# Patient Record
Sex: FEMALE | Race: WHITE | NOT HISPANIC OR LATINO | URBAN - METROPOLITAN AREA
[De-identification: names, ages, dates, MRNs, and addresses within clinical notes are randomized per-mention and may not be internally consistent; named-entity substitution may affect disease eponyms.]

---

## 2017-05-30 ENCOUNTER — OUTPATIENT (OUTPATIENT)
Dept: OUTPATIENT SERVICES | Facility: HOSPITAL | Age: 67
LOS: 1 days | End: 2017-05-30

## 2017-09-19 ENCOUNTER — RESULT REVIEW (OUTPATIENT)
Age: 67
End: 2017-09-19

## 2017-09-27 ENCOUNTER — APPOINTMENT (OUTPATIENT)
Dept: OTOLARYNGOLOGY | Facility: CLINIC | Age: 67
End: 2017-09-27
Payer: MEDICARE

## 2017-09-27 VITALS — SYSTOLIC BLOOD PRESSURE: 110 MMHG | HEIGHT: 63.5 IN | HEART RATE: 69 BPM | DIASTOLIC BLOOD PRESSURE: 71 MMHG

## 2017-09-27 PROCEDURE — 92557 COMPREHENSIVE HEARING TEST: CPT

## 2017-09-27 PROCEDURE — 92567 TYMPANOMETRY: CPT

## 2017-09-27 PROCEDURE — 99214 OFFICE O/P EST MOD 30 MIN: CPT

## 2018-01-09 ENCOUNTER — OUTPATIENT (OUTPATIENT)
Dept: OUTPATIENT SERVICES | Facility: HOSPITAL | Age: 68
LOS: 1 days | End: 2018-01-09

## 2018-06-07 ENCOUNTER — OUTPATIENT (OUTPATIENT)
Dept: OUTPATIENT SERVICES | Facility: HOSPITAL | Age: 68
LOS: 1 days | End: 2018-06-07

## 2018-09-06 ENCOUNTER — MESSAGE (OUTPATIENT)
Age: 68
End: 2018-09-06

## 2018-09-17 ENCOUNTER — APPOINTMENT (OUTPATIENT)
Dept: OTOLARYNGOLOGY | Facility: CLINIC | Age: 68
End: 2018-09-17
Payer: MEDICARE

## 2018-09-17 VITALS
WEIGHT: 251 LBS | HEART RATE: 65 BPM | HEIGHT: 63.5 IN | BODY MASS INDEX: 43.92 KG/M2 | SYSTOLIC BLOOD PRESSURE: 95 MMHG | DIASTOLIC BLOOD PRESSURE: 53 MMHG

## 2018-09-17 DIAGNOSIS — H69.81 OTHER SPECIFIED DISORDERS OF EUSTACHIAN TUBE, RIGHT EAR: ICD-10-CM

## 2018-09-17 PROCEDURE — 92557 COMPREHENSIVE HEARING TEST: CPT

## 2018-09-17 PROCEDURE — 92567 TYMPANOMETRY: CPT

## 2018-09-17 PROCEDURE — 99214 OFFICE O/P EST MOD 30 MIN: CPT

## 2018-09-17 RX ORDER — LINACLOTIDE 72 UG/1
CAPSULE, GELATIN COATED ORAL
Refills: 0 | Status: ACTIVE | COMMUNITY

## 2018-09-17 RX ORDER — FUROSEMIDE 20 MG/1
20 TABLET ORAL
Refills: 0 | Status: ACTIVE | COMMUNITY

## 2018-09-17 RX ORDER — CHOLECALCIFEROL (VITAMIN D3) 25 MCG
TABLET ORAL
Refills: 0 | Status: DISCONTINUED | COMMUNITY
End: 2018-09-17

## 2018-09-17 RX ORDER — MAGNESIUM 200 MG
200 TABLET ORAL
Refills: 0 | Status: DISCONTINUED | COMMUNITY
End: 2018-09-17

## 2018-10-26 ENCOUNTER — OUTPATIENT (OUTPATIENT)
Dept: OUTPATIENT SERVICES | Facility: HOSPITAL | Age: 68
LOS: 1 days | End: 2018-10-26

## 2019-10-02 ENCOUNTER — APPOINTMENT (OUTPATIENT)
Dept: OTOLARYNGOLOGY | Facility: CLINIC | Age: 69
End: 2019-10-02
Payer: MEDICARE

## 2019-10-02 VITALS
BODY MASS INDEX: 51.91 KG/M2 | WEIGHT: 293 LBS | HEART RATE: 76 BPM | SYSTOLIC BLOOD PRESSURE: 118 MMHG | DIASTOLIC BLOOD PRESSURE: 75 MMHG | HEIGHT: 63 IN

## 2019-10-02 DIAGNOSIS — R42 DIZZINESS AND GIDDINESS: ICD-10-CM

## 2019-10-02 PROCEDURE — 92567 TYMPANOMETRY: CPT

## 2019-10-02 PROCEDURE — 92557 COMPREHENSIVE HEARING TEST: CPT

## 2019-10-02 PROCEDURE — 99214 OFFICE O/P EST MOD 30 MIN: CPT

## 2019-10-15 NOTE — PHYSICAL EXAM
[Midline] : trachea located in midline position [Normal] : orientation to person, place, and time: normal [de-identified] : no change in right inferior glomus [] : [place]-Hallpike test is negative

## 2019-10-15 NOTE — REASON FOR VISIT
[Subsequent Evaluation] : a subsequent evaluation for [Other: _____] : [unfilled] [FreeTextEntry2] : follow up hearing loss and vertigo

## 2019-10-15 NOTE — HISTORY OF PRESENT ILLNESS
[de-identified] : 69 year old female follow up hearing loss and vertigo. States has episodes of vertigo about 3 times a week, lasting less than a minute when laying down - think related to neck.   History of glomus tumor right ear. States tinnitus is better.  States intermittent right otalgia for about a month.  States occasional tinnitus, not sure if both or not.  Patient denies otorrhea, ear infections.  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

## 2020-10-01 ENCOUNTER — APPOINTMENT (OUTPATIENT)
Dept: OTOLARYNGOLOGY | Facility: CLINIC | Age: 70
End: 2020-10-01
Payer: MEDICARE

## 2020-10-01 PROCEDURE — 92557 COMPREHENSIVE HEARING TEST: CPT

## 2020-10-01 PROCEDURE — 92567 TYMPANOMETRY: CPT

## 2020-10-01 PROCEDURE — 99213 OFFICE O/P EST LOW 20 MIN: CPT

## 2020-10-01 RX ORDER — FAMOTIDINE 40 MG/1
TABLET, FILM COATED ORAL
Refills: 0 | Status: ACTIVE | COMMUNITY

## 2020-10-01 RX ORDER — CALCIUM CARBONATE/VITAMIN D3 600 MG-20
600-125 TABLET ORAL
Refills: 0 | Status: DISCONTINUED | COMMUNITY
End: 2020-10-01

## 2020-10-01 RX ORDER — ASCORBIC ACID 500 MG
TABLET ORAL
Refills: 0 | Status: ACTIVE | COMMUNITY

## 2020-10-01 RX ORDER — CHROMIUM 200 MCG
TABLET ORAL
Refills: 0 | Status: ACTIVE | COMMUNITY

## 2020-10-01 RX ORDER — RANITIDINE 300 MG/1
300 TABLET ORAL
Refills: 0 | Status: DISCONTINUED | COMMUNITY
End: 2020-10-01

## 2020-10-01 RX ORDER — BIOTIN 10 MG
TABLET ORAL
Refills: 0 | Status: DISCONTINUED | COMMUNITY
End: 2020-10-01

## 2020-10-01 RX ORDER — ALBUTEROL SULFATE 90 UG/1
AEROSOL, METERED RESPIRATORY (INHALATION)
Refills: 0 | Status: DISCONTINUED | COMMUNITY
End: 2020-10-01

## 2020-10-01 RX ORDER — VIT A/VIT C/VIT E/ZINC/COPPER 4296-226
CAPSULE ORAL
Refills: 0 | Status: ACTIVE | COMMUNITY

## 2020-10-01 RX ORDER — BENZONATATE 100 MG/1
100 CAPSULE ORAL
Refills: 0 | Status: DISCONTINUED | COMMUNITY
End: 2020-10-01

## 2020-10-01 RX ORDER — VIT A/VIT C/VIT E/ZINC/COPPER 4296-226
CAPSULE ORAL
Refills: 0 | Status: DISCONTINUED | COMMUNITY
End: 2020-10-01

## 2020-10-01 RX ORDER — BIMATOPROST 0.1 MG/ML
0.01 SOLUTION/ DROPS OPHTHALMIC
Refills: 0 | Status: ACTIVE | COMMUNITY

## 2020-10-01 NOTE — DATA REVIEWED
[de-identified] : Bilateral mild to moderate sensorineural hearing loss \par Impedance testing reveals normal Type A tympanograms bilaterally\par

## 2020-10-01 NOTE — HISTORY OF PRESENT ILLNESS
[de-identified] : 70F f/u for symmetric SNHL - hx glomus tumor- had CT showing stable in size. Pt feels hearing is stable- denies otalgia, otorrhea, ear infections.  No changes in medical hx. No pulsatile tinnitus - does not want Hearing Aids

## 2021-11-01 ENCOUNTER — APPOINTMENT (OUTPATIENT)
Dept: OTOLARYNGOLOGY | Facility: CLINIC | Age: 71
End: 2021-11-01

## 2022-04-20 ENCOUNTER — APPOINTMENT (OUTPATIENT)
Dept: OTOLARYNGOLOGY | Facility: CLINIC | Age: 72
End: 2022-04-20
Payer: MEDICARE

## 2022-04-20 PROCEDURE — 92557 COMPREHENSIVE HEARING TEST: CPT

## 2022-04-20 PROCEDURE — 99213 OFFICE O/P EST LOW 20 MIN: CPT

## 2022-04-20 PROCEDURE — 92567 TYMPANOMETRY: CPT

## 2022-04-20 NOTE — DATA REVIEWED
[de-identified] : Bilateral -mild to moderate sensorineural hearing loss\par Impedance testing reveals normal Type A tympanograms bilaterally\par

## 2022-04-20 NOTE — PHYSICAL EXAM
[Midline] : trachea located in midline position [Normal] : orientation to person, place, and time: normal [de-identified] : no change in right inferior glomus [] : [place]-Hallpike test is negative

## 2022-04-20 NOTE — HISTORY OF PRESENT ILLNESS
[de-identified] : Patient with hx of right glomus - no sig pulsatile tinnitus - no Hearing Aids - doesn’t want them

## 2022-11-08 ENCOUNTER — OFFICE (OUTPATIENT)
Dept: URBAN - METROPOLITAN AREA CLINIC 103 | Facility: CLINIC | Age: 72
Setting detail: OPHTHALMOLOGY
End: 2022-11-08
Payer: MEDICARE

## 2022-11-08 DIAGNOSIS — H35.3231: ICD-10-CM

## 2022-11-08 DIAGNOSIS — H35.3211: ICD-10-CM

## 2022-11-08 PROCEDURE — 92134 CPTRZ OPH DX IMG PST SGM RTA: CPT | Performed by: SPECIALIST

## 2022-11-08 PROCEDURE — 67028 INJECTION EYE DRUG: CPT | Performed by: SPECIALIST

## 2022-11-08 PROCEDURE — 92235 FLUORESCEIN ANGRPH MLTIFRAME: CPT | Performed by: SPECIALIST

## 2022-11-08 ASSESSMENT — TONOMETRY
OD_IOP_MMHG: 20
OS_IOP_MMHG: 20

## 2022-11-08 ASSESSMENT — KERATOMETRY
OD_AXISANGLE_DEGREES: 052
OS_K2POWER_DIOPTERS: 43.50
OD_K2POWER_DIOPTERS: 42.75
OD_K1POWER_DIOPTERS: 41.25
OS_AXISANGLE_DEGREES: 163
OS_K1POWER_DIOPTERS: 42.75

## 2022-11-08 ASSESSMENT — REFRACTION_AUTOREFRACTION
OD_AXIS: 143
OD_CYLINDER: -1.50
OD_SPHERE: -2.00
OS_SPHERE: -0.50
OS_AXIS: 082
OS_CYLINDER: -0.50

## 2022-11-08 ASSESSMENT — CORNEAL DYSTROPHY
OS_DYSTROPHY: MDF
OD_DYSTROPHY: MDF

## 2022-11-08 ASSESSMENT — PACHYMETRY
OS_CT_UM: 599
OD_CT_UM: 619
OS_CT_CORRECTION: -4
OD_CT_CORRECTION: -5

## 2022-11-08 ASSESSMENT — VISUAL ACUITY
OS_BCVA: 20/100-1
OD_BCVA: 20/25

## 2022-11-08 ASSESSMENT — SPHEQUIV_DERIVED
OD_SPHEQUIV: -2.75
OS_SPHEQUIV: -0.75

## 2022-11-08 ASSESSMENT — SUPERFICIAL PUNCTATE KERATITIS (SPK)
OD_SPK: T
OS_SPK: T

## 2022-11-08 ASSESSMENT — AXIALLENGTH_DERIVED
OD_AL: 25.3341
OS_AL: 24.0298

## 2022-11-08 ASSESSMENT — CONFRONTATIONAL VISUAL FIELD TEST (CVF)
OS_FINDINGS: FULL
OD_FINDINGS: FULL

## 2022-11-23 ENCOUNTER — OFFICE (OUTPATIENT)
Dept: URBAN - METROPOLITAN AREA CLINIC 103 | Facility: CLINIC | Age: 72
Setting detail: OPHTHALMOLOGY
End: 2022-11-23
Payer: MEDICARE

## 2022-11-23 DIAGNOSIS — H43.813: ICD-10-CM

## 2022-11-23 DIAGNOSIS — H35.3231: ICD-10-CM

## 2022-11-23 PROCEDURE — 92134 CPTRZ OPH DX IMG PST SGM RTA: CPT | Performed by: SPECIALIST

## 2022-11-23 PROCEDURE — 92012 INTRM OPH EXAM EST PATIENT: CPT | Performed by: SPECIALIST

## 2022-11-23 ASSESSMENT — KERATOMETRY
OS_K1POWER_DIOPTERS: 42.75
OD_K2POWER_DIOPTERS: 42.75
OS_K2POWER_DIOPTERS: 43.50
OD_K1POWER_DIOPTERS: 41.25
OD_AXISANGLE_DEGREES: 052
OS_AXISANGLE_DEGREES: 163

## 2022-11-23 ASSESSMENT — REFRACTION_AUTOREFRACTION
OS_SPHERE: -0.50
OD_AXIS: 143
OD_SPHERE: -2.00
OS_AXIS: 082
OS_CYLINDER: -0.50
OD_CYLINDER: -1.50

## 2022-11-23 ASSESSMENT — CONFRONTATIONAL VISUAL FIELD TEST (CVF)
OD_FINDINGS: FULL
OS_FINDINGS: FULL

## 2022-11-23 ASSESSMENT — PACHYMETRY
OD_CT_CORRECTION: -5
OS_CT_UM: 599
OD_CT_UM: 619
OS_CT_CORRECTION: -4

## 2022-11-23 ASSESSMENT — SUPERFICIAL PUNCTATE KERATITIS (SPK)
OS_SPK: T
OD_SPK: T

## 2022-11-23 ASSESSMENT — SPHEQUIV_DERIVED
OD_SPHEQUIV: -2.75
OS_SPHEQUIV: -0.75

## 2022-11-23 ASSESSMENT — CORNEAL DYSTROPHY
OD_DYSTROPHY: MDF
OS_DYSTROPHY: MDF

## 2022-11-23 ASSESSMENT — AXIALLENGTH_DERIVED
OD_AL: 25.3341
OS_AL: 24.0298

## 2022-11-23 ASSESSMENT — TONOMETRY
OS_IOP_MMHG: 20
OD_IOP_MMHG: 16

## 2022-11-23 ASSESSMENT — VISUAL ACUITY
OD_BCVA: 20/20-
OS_BCVA: 20/100+

## 2022-12-16 ENCOUNTER — OFFICE (OUTPATIENT)
Dept: URBAN - METROPOLITAN AREA CLINIC 103 | Facility: CLINIC | Age: 72
Setting detail: OPHTHALMOLOGY
End: 2022-12-16
Payer: MEDICARE

## 2022-12-16 VITALS — BODY MASS INDEX: 51.91 KG/M2 | WEIGHT: 293 LBS | HEIGHT: 63 IN

## 2022-12-16 DIAGNOSIS — H52.7: ICD-10-CM

## 2022-12-16 DIAGNOSIS — H04.123: ICD-10-CM

## 2022-12-16 DIAGNOSIS — H16.103: ICD-10-CM

## 2022-12-16 DIAGNOSIS — H40.1131: ICD-10-CM

## 2022-12-16 DIAGNOSIS — H18.893: ICD-10-CM

## 2022-12-16 PROCEDURE — 92015 DETERMINE REFRACTIVE STATE: CPT | Performed by: OPHTHALMOLOGY

## 2022-12-16 PROCEDURE — 92083 EXTENDED VISUAL FIELD XM: CPT | Performed by: OPHTHALMOLOGY

## 2022-12-16 PROCEDURE — 92014 COMPRE OPH EXAM EST PT 1/>: CPT | Performed by: OPHTHALMOLOGY

## 2022-12-16 ASSESSMENT — REFRACTION_CURRENTRX
OD_AXIS: 148
OS_ADD: +2.75
OS_CYLINDER: -1.00
OS_SPHERE: +0.50
OS_AXIS: 055
OD_OVR_VA: 20/
OD_SPHERE: -1.25
OS_VPRISM_DIRECTION: PROGS
OD_ADD: +2.75
OD_VPRISM_DIRECTION: PROGS
OD_CYLINDER: -1.75
OS_OVR_VA: 20/

## 2022-12-16 ASSESSMENT — REFRACTION_AUTOREFRACTION
OS_CYLINDER: -1.00
OS_AXIS: 083
OS_SPHERE: -0.25
OD_AXIS: 137
OD_CYLINDER: -2.00
OD_SPHERE: -2.25

## 2022-12-16 ASSESSMENT — PACHYMETRY
OS_CT_CORRECTION: -4
OD_CT_CORRECTION: -5
OS_CT_UM: 599
OD_CT_UM: 619

## 2022-12-16 ASSESSMENT — KERATOMETRY
OS_K2POWER_DIOPTERS: 44.25
OD_K1POWER_DIOPTERS: 41.75
OD_AXISANGLE_DEGREES: 052
OS_K1POWER_DIOPTERS: 43.25
OS_AXISANGLE_DEGREES: 168
OD_K2POWER_DIOPTERS: 42.50

## 2022-12-16 ASSESSMENT — REFRACTION_MANIFEST
OS_CYLINDER: -1.00
OD_AXIS: 148
OD_SPHERE: -1.25
OS_AXIS: 082
OS_VA1: 20
OS_SPHERE: +0.25
OD_ADD: +2.75
OS_ADD: +2.75
OD_VA1: 20/100
OD_CYLINDER: -1.75

## 2022-12-16 ASSESSMENT — CORNEAL DYSTROPHY
OS_DYSTROPHY: MDF
OD_DYSTROPHY: MDF

## 2022-12-16 ASSESSMENT — TONOMETRY
OD_IOP_MMHG: 18
OS_IOP_MMHG: 20

## 2022-12-16 ASSESSMENT — SUPERFICIAL PUNCTATE KERATITIS (SPK)
OD_SPK: T
OS_SPK: T

## 2022-12-16 ASSESSMENT — AXIALLENGTH_DERIVED
OD_AL: 25.5069
OS_AL: 23.5975
OD_AL: 25.0047
OS_AL: 23.794

## 2022-12-16 ASSESSMENT — VISUAL ACUITY
OS_BCVA: 20/100+
OD_BCVA: 20/20-

## 2022-12-16 ASSESSMENT — SPHEQUIV_DERIVED
OD_SPHEQUIV: -2.125
OD_SPHEQUIV: -3.25
OS_SPHEQUIV: -0.25
OS_SPHEQUIV: -0.75

## 2022-12-16 ASSESSMENT — CONFRONTATIONAL VISUAL FIELD TEST (CVF)
OS_FINDINGS: FULL
OD_FINDINGS: FULL

## 2023-01-25 ENCOUNTER — OFFICE (OUTPATIENT)
Dept: URBAN - METROPOLITAN AREA CLINIC 103 | Facility: CLINIC | Age: 73
Setting detail: OPHTHALMOLOGY
End: 2023-01-25
Payer: MEDICARE

## 2023-01-25 DIAGNOSIS — H35.3231: ICD-10-CM

## 2023-01-25 DIAGNOSIS — H35.3211: ICD-10-CM

## 2023-01-25 PROCEDURE — 92134 CPTRZ OPH DX IMG PST SGM RTA: CPT | Performed by: SPECIALIST

## 2023-01-25 PROCEDURE — 67028 INJECTION EYE DRUG: CPT | Performed by: SPECIALIST

## 2023-01-25 ASSESSMENT — PACHYMETRY
OS_CT_UM: 599
OD_CT_UM: 619
OD_CT_CORRECTION: -5
OS_CT_CORRECTION: -4

## 2023-01-25 ASSESSMENT — REFRACTION_CURRENTRX
OS_ADD: +2.75
OD_VPRISM_DIRECTION: PROGS
OS_OVR_VA: 20/
OD_OVR_VA: 20/
OD_ADD: +2.75
OD_AXIS: 148
OS_CYLINDER: -1.00
OS_SPHERE: +0.50
OD_CYLINDER: -1.75
OS_AXIS: 055
OS_VPRISM_DIRECTION: PROGS
OD_SPHERE: -1.25

## 2023-01-25 ASSESSMENT — TONOMETRY
OD_IOP_MMHG: 18
OS_IOP_MMHG: 20

## 2023-01-25 ASSESSMENT — KERATOMETRY
OD_AXISANGLE_DEGREES: 052
OD_K2POWER_DIOPTERS: 42.50
OD_K1POWER_DIOPTERS: 41.75
OS_K2POWER_DIOPTERS: 44.25
OS_AXISANGLE_DEGREES: 168
OS_K1POWER_DIOPTERS: 43.25

## 2023-01-25 ASSESSMENT — REFRACTION_AUTOREFRACTION
OD_AXIS: 137
OD_CYLINDER: -2.00
OD_SPHERE: -2.25
OS_SPHERE: -0.25
OS_AXIS: 083
OS_CYLINDER: -1.00

## 2023-01-25 ASSESSMENT — SUPERFICIAL PUNCTATE KERATITIS (SPK)
OD_SPK: T
OS_SPK: T

## 2023-01-25 ASSESSMENT — SPHEQUIV_DERIVED
OD_SPHEQUIV: -3.25
OS_SPHEQUIV: -0.75

## 2023-01-25 ASSESSMENT — CONFRONTATIONAL VISUAL FIELD TEST (CVF)
OD_FINDINGS: FULL
OS_FINDINGS: FULL

## 2023-01-25 ASSESSMENT — CORNEAL DYSTROPHY
OS_DYSTROPHY: MDF
OD_DYSTROPHY: MDF

## 2023-01-25 ASSESSMENT — VISUAL ACUITY
OD_BCVA: 20/20
OS_BCVA: 20/100-1

## 2023-01-25 ASSESSMENT — AXIALLENGTH_DERIVED
OD_AL: 25.5069
OS_AL: 23.794

## 2023-02-14 ENCOUNTER — OFFICE (OUTPATIENT)
Dept: URBAN - METROPOLITAN AREA CLINIC 103 | Facility: CLINIC | Age: 73
Setting detail: OPHTHALMOLOGY
End: 2023-02-14
Payer: MEDICARE

## 2023-02-14 DIAGNOSIS — H43.813: ICD-10-CM

## 2023-02-14 DIAGNOSIS — H35.3231: ICD-10-CM

## 2023-02-14 PROCEDURE — 92012 INTRM OPH EXAM EST PATIENT: CPT | Performed by: SPECIALIST

## 2023-02-14 PROCEDURE — 92134 CPTRZ OPH DX IMG PST SGM RTA: CPT | Performed by: SPECIALIST

## 2023-02-14 ASSESSMENT — AXIALLENGTH_DERIVED
OD_AL: 25.5069
OS_AL: 23.794

## 2023-02-14 ASSESSMENT — REFRACTION_AUTOREFRACTION
OD_CYLINDER: -2.00
OS_CYLINDER: -1.00
OD_AXIS: 137
OS_AXIS: 083
OS_SPHERE: -0.25
OD_SPHERE: -2.25

## 2023-02-14 ASSESSMENT — REFRACTION_CURRENTRX
OD_CYLINDER: -1.75
OS_SPHERE: +0.50
OD_VPRISM_DIRECTION: PROGS
OS_ADD: +2.75
OS_OVR_VA: 20/
OS_AXIS: 055
OS_VPRISM_DIRECTION: PROGS
OD_SPHERE: -1.25
OD_AXIS: 148
OD_OVR_VA: 20/
OD_ADD: +2.75
OS_CYLINDER: -1.00

## 2023-02-14 ASSESSMENT — CORNEAL DYSTROPHY
OD_DYSTROPHY: MDF
OS_DYSTROPHY: MDF

## 2023-02-14 ASSESSMENT — PACHYMETRY
OD_CT_UM: 619
OS_CT_CORRECTION: -4
OS_CT_UM: 599
OD_CT_CORRECTION: -5

## 2023-02-14 ASSESSMENT — KERATOMETRY
OS_AXISANGLE_DEGREES: 168
OD_K2POWER_DIOPTERS: 42.50
OD_K1POWER_DIOPTERS: 41.75
OD_AXISANGLE_DEGREES: 052
OS_K2POWER_DIOPTERS: 44.25
OS_K1POWER_DIOPTERS: 43.25

## 2023-02-14 ASSESSMENT — SUPERFICIAL PUNCTATE KERATITIS (SPK)
OS_SPK: T
OD_SPK: T

## 2023-02-14 ASSESSMENT — SPHEQUIV_DERIVED
OS_SPHEQUIV: -0.75
OD_SPHEQUIV: -3.25

## 2023-02-14 ASSESSMENT — TONOMETRY
OD_IOP_MMHG: 16
OS_IOP_MMHG: 20

## 2023-02-14 ASSESSMENT — VISUAL ACUITY
OD_BCVA: 20/20-
OS_BCVA: 20/80-2

## 2023-03-22 ENCOUNTER — OFFICE (OUTPATIENT)
Dept: URBAN - METROPOLITAN AREA CLINIC 103 | Facility: CLINIC | Age: 73
Setting detail: OPHTHALMOLOGY
End: 2023-03-22
Payer: MEDICARE

## 2023-03-22 DIAGNOSIS — H35.3231: ICD-10-CM

## 2023-03-22 DIAGNOSIS — H35.3211: ICD-10-CM

## 2023-03-22 PROCEDURE — 92134 CPTRZ OPH DX IMG PST SGM RTA: CPT | Performed by: SPECIALIST

## 2023-03-22 PROCEDURE — 67028 INJECTION EYE DRUG: CPT | Performed by: SPECIALIST

## 2023-03-22 ASSESSMENT — KERATOMETRY
OS_K1POWER_DIOPTERS: 43.25
OS_AXISANGLE_DEGREES: 168
OS_K2POWER_DIOPTERS: 44.25
OD_K2POWER_DIOPTERS: 42.50
OD_AXISANGLE_DEGREES: 052
OD_K1POWER_DIOPTERS: 41.75

## 2023-03-22 ASSESSMENT — REFRACTION_AUTOREFRACTION
OD_SPHERE: -2.25
OS_CYLINDER: -1.00
OD_AXIS: 137
OS_SPHERE: -0.25
OD_CYLINDER: -2.00
OS_AXIS: 083

## 2023-03-22 ASSESSMENT — VISUAL ACUITY
OS_BCVA: 20/100-1
OD_BCVA: 20/25

## 2023-03-22 ASSESSMENT — CONFRONTATIONAL VISUAL FIELD TEST (CVF)
OD_FINDINGS: FULL
OS_FINDINGS: FULL

## 2023-03-22 ASSESSMENT — AXIALLENGTH_DERIVED
OS_AL: 23.794
OD_AL: 25.5069

## 2023-03-22 ASSESSMENT — PACHYMETRY
OS_CT_CORRECTION: -4
OD_CT_UM: 619
OD_CT_CORRECTION: -5
OS_CT_UM: 599

## 2023-03-22 ASSESSMENT — SUPERFICIAL PUNCTATE KERATITIS (SPK)
OS_SPK: T
OD_SPK: T

## 2023-03-22 ASSESSMENT — TONOMETRY
OD_IOP_MMHG: 17
OS_IOP_MMHG: 19

## 2023-03-22 ASSESSMENT — CORNEAL DYSTROPHY
OD_DYSTROPHY: MDF
OS_DYSTROPHY: MDF

## 2023-03-22 ASSESSMENT — SPHEQUIV_DERIVED
OS_SPHEQUIV: -0.75
OD_SPHEQUIV: -3.25

## 2023-04-26 ENCOUNTER — OFFICE (OUTPATIENT)
Dept: URBAN - METROPOLITAN AREA CLINIC 103 | Facility: CLINIC | Age: 73
Setting detail: OPHTHALMOLOGY
End: 2023-04-26
Payer: MEDICARE

## 2023-04-26 VITALS — WEIGHT: 293 LBS | BODY MASS INDEX: 51.91 KG/M2 | HEIGHT: 63 IN

## 2023-04-26 DIAGNOSIS — H43.813: ICD-10-CM

## 2023-04-26 DIAGNOSIS — H35.3231: ICD-10-CM

## 2023-04-26 PROCEDURE — 92012 INTRM OPH EXAM EST PATIENT: CPT | Performed by: SPECIALIST

## 2023-04-26 PROCEDURE — 92134 CPTRZ OPH DX IMG PST SGM RTA: CPT | Performed by: SPECIALIST

## 2023-04-26 ASSESSMENT — SUPERFICIAL PUNCTATE KERATITIS (SPK)
OS_SPK: T
OD_SPK: T

## 2023-04-26 ASSESSMENT — KERATOMETRY
OD_AXISANGLE_DEGREES: 052
OS_K2POWER_DIOPTERS: 44.25
OD_K1POWER_DIOPTERS: 41.75
OD_K2POWER_DIOPTERS: 42.50
OS_AXISANGLE_DEGREES: 168
OS_K1POWER_DIOPTERS: 43.25

## 2023-04-26 ASSESSMENT — REFRACTION_AUTOREFRACTION
OD_SPHERE: -2.25
OS_CYLINDER: -1.00
OD_CYLINDER: -2.00
OD_AXIS: 137
OS_SPHERE: -0.25
OS_AXIS: 083

## 2023-04-26 ASSESSMENT — PACHYMETRY
OD_CT_UM: 619
OS_CT_UM: 599
OD_CT_CORRECTION: -5
OS_CT_CORRECTION: -4

## 2023-04-26 ASSESSMENT — TONOMETRY
OD_IOP_MMHG: 17
OS_IOP_MMHG: 20

## 2023-04-26 ASSESSMENT — CORNEAL DYSTROPHY
OD_DYSTROPHY: MDF
OS_DYSTROPHY: MDF

## 2023-04-26 ASSESSMENT — SPHEQUIV_DERIVED
OD_SPHEQUIV: -3.25
OS_SPHEQUIV: -0.75

## 2023-04-26 ASSESSMENT — VISUAL ACUITY
OS_BCVA: 20/100-1
OD_BCVA: 20/20

## 2023-04-26 ASSESSMENT — AXIALLENGTH_DERIVED
OS_AL: 23.794
OD_AL: 25.5069

## 2023-06-13 ENCOUNTER — OFFICE (OUTPATIENT)
Dept: URBAN - METROPOLITAN AREA CLINIC 103 | Facility: CLINIC | Age: 73
Setting detail: OPHTHALMOLOGY
End: 2023-06-13
Payer: MEDICARE

## 2023-06-13 DIAGNOSIS — H35.3231: ICD-10-CM

## 2023-06-13 DIAGNOSIS — H43.813: ICD-10-CM

## 2023-06-13 DIAGNOSIS — H35.3211: ICD-10-CM

## 2023-06-13 PROCEDURE — 67028 INJECTION EYE DRUG: CPT | Performed by: SPECIALIST

## 2023-06-13 PROCEDURE — 92134 CPTRZ OPH DX IMG PST SGM RTA: CPT | Performed by: SPECIALIST

## 2023-06-13 ASSESSMENT — KERATOMETRY
OD_K2POWER_DIOPTERS: 42.50
OS_K1POWER_DIOPTERS: 43.25
OS_K2POWER_DIOPTERS: 44.25
OD_K1POWER_DIOPTERS: 41.75
OD_AXISANGLE_DEGREES: 052
OS_AXISANGLE_DEGREES: 168

## 2023-06-13 ASSESSMENT — SUPERFICIAL PUNCTATE KERATITIS (SPK)
OS_SPK: T
OD_SPK: T

## 2023-06-13 ASSESSMENT — PACHYMETRY
OS_CT_CORRECTION: -4
OD_CT_UM: 619
OD_CT_CORRECTION: -5
OS_CT_UM: 599

## 2023-06-13 ASSESSMENT — CONFRONTATIONAL VISUAL FIELD TEST (CVF)
OS_FINDINGS: FULL
OD_FINDINGS: FULL

## 2023-06-13 ASSESSMENT — TONOMETRY: OD_IOP_MMHG: 19

## 2023-06-13 ASSESSMENT — VISUAL ACUITY
OS_BCVA: 20/100-1
OD_BCVA: 20/20

## 2023-06-13 ASSESSMENT — SPHEQUIV_DERIVED
OD_SPHEQUIV: -3.25
OS_SPHEQUIV: -0.75

## 2023-06-13 ASSESSMENT — CORNEAL DYSTROPHY
OD_DYSTROPHY: MDF
OS_DYSTROPHY: MDF

## 2023-06-13 ASSESSMENT — REFRACTION_AUTOREFRACTION
OS_CYLINDER: -1.00
OS_SPHERE: -0.25
OD_SPHERE: -2.25
OD_CYLINDER: -2.00
OD_AXIS: 137
OS_AXIS: 083

## 2023-06-13 ASSESSMENT — AXIALLENGTH_DERIVED
OD_AL: 25.5069
OS_AL: 23.794

## 2023-06-26 ENCOUNTER — OFFICE (OUTPATIENT)
Dept: URBAN - METROPOLITAN AREA CLINIC 103 | Facility: CLINIC | Age: 73
Setting detail: OPHTHALMOLOGY
End: 2023-06-26
Payer: MEDICARE

## 2023-06-26 DIAGNOSIS — H40.1131: ICD-10-CM

## 2023-06-26 PROCEDURE — 92012 INTRM OPH EXAM EST PATIENT: CPT | Performed by: OPHTHALMOLOGY

## 2023-06-26 PROCEDURE — 92133 CPTRZD OPH DX IMG PST SGM ON: CPT | Performed by: OPHTHALMOLOGY

## 2023-06-26 ASSESSMENT — PACHYMETRY
OD_CT_CORRECTION: -5
OS_CT_UM: 599
OD_CT_UM: 619
OS_CT_CORRECTION: -4

## 2023-06-26 ASSESSMENT — REFRACTION_AUTOREFRACTION
OS_AXIS: 067
OD_CYLINDER: -1.25
OS_CYLINDER: -0.75
OD_AXIS: 139
OD_SPHERE: -3.00
OS_SPHERE: -0.50

## 2023-06-26 ASSESSMENT — SUPERFICIAL PUNCTATE KERATITIS (SPK)
OD_SPK: T
OS_SPK: T

## 2023-06-26 ASSESSMENT — REFRACTION_CURRENTRX
OD_SPHERE: -3.00
OS_SPHERE: -0.50
OS_OVR_VA: 20/
OD_OVR_VA: 20/
OD_ADD: +2.75
OD_CYLINDER: -1.25
OD_AXIS: 139
OS_CYLINDER: -0.75
OS_AXIS: 067
OS_ADD: +2.75

## 2023-06-26 ASSESSMENT — KERATOMETRY
OD_K2POWER_DIOPTERS: 43.50
OS_AXISANGLE_DEGREES: 156
OD_AXISANGLE_DEGREES: 067
OS_K2POWER_DIOPTERS: 44.00
OS_K1POWER_DIOPTERS: 43.50
OD_K1POWER_DIOPTERS: 42.50

## 2023-06-26 ASSESSMENT — VISUAL ACUITY
OD_BCVA: 20/25
OS_BCVA: 20/150+

## 2023-06-26 ASSESSMENT — AXIALLENGTH_DERIVED
OD_AL: 25.3037
OS_AL: 23.8436

## 2023-06-26 ASSESSMENT — TONOMETRY
OD_IOP_MMHG: 17
OS_IOP_MMHG: 19

## 2023-06-26 ASSESSMENT — CONFRONTATIONAL VISUAL FIELD TEST (CVF)
OS_FINDINGS: FULL
OD_FINDINGS: FULL

## 2023-06-26 ASSESSMENT — CORNEAL DYSTROPHY
OD_DYSTROPHY: MDF
OS_DYSTROPHY: MDF

## 2023-06-26 ASSESSMENT — SPHEQUIV_DERIVED
OD_SPHEQUIV: -3.625
OS_SPHEQUIV: -0.875

## 2023-07-11 ENCOUNTER — OFFICE (OUTPATIENT)
Dept: URBAN - METROPOLITAN AREA CLINIC 103 | Facility: CLINIC | Age: 73
Setting detail: OPHTHALMOLOGY
End: 2023-07-11
Payer: MEDICARE

## 2023-07-11 DIAGNOSIS — H35.3221: ICD-10-CM

## 2023-07-11 DIAGNOSIS — H35.3231: ICD-10-CM

## 2023-07-11 DIAGNOSIS — H43.813: ICD-10-CM

## 2023-07-11 DIAGNOSIS — H35.3211: ICD-10-CM

## 2023-07-11 PROCEDURE — 92012 INTRM OPH EXAM EST PATIENT: CPT | Performed by: SPECIALIST

## 2023-07-11 PROCEDURE — 92134 CPTRZ OPH DX IMG PST SGM RTA: CPT | Performed by: SPECIALIST

## 2023-07-11 ASSESSMENT — REFRACTION_AUTOREFRACTION
OS_CYLINDER: -0.75
OS_AXIS: 067
OD_AXIS: 139
OD_CYLINDER: -1.25
OD_SPHERE: -3.00
OS_SPHERE: -0.50

## 2023-07-11 ASSESSMENT — TONOMETRY
OD_IOP_MMHG: 17
OS_IOP_MMHG: 19

## 2023-07-11 ASSESSMENT — CORNEAL DYSTROPHY
OS_DYSTROPHY: MDF
OD_DYSTROPHY: MDF

## 2023-07-11 ASSESSMENT — KERATOMETRY
OD_K1POWER_DIOPTERS: 42.50
OS_K2POWER_DIOPTERS: 44.00
OD_AXISANGLE_DEGREES: 067
OS_K1POWER_DIOPTERS: 43.50
OD_K2POWER_DIOPTERS: 43.50
OS_AXISANGLE_DEGREES: 156

## 2023-07-11 ASSESSMENT — PACHYMETRY
OS_CT_CORRECTION: -4
OD_CT_UM: 619
OD_CT_CORRECTION: -5
OS_CT_UM: 599

## 2023-07-11 ASSESSMENT — SUPERFICIAL PUNCTATE KERATITIS (SPK)
OD_SPK: T
OS_SPK: T

## 2023-07-11 ASSESSMENT — SPHEQUIV_DERIVED
OS_SPHEQUIV: -0.875
OD_SPHEQUIV: -3.625

## 2023-07-11 ASSESSMENT — CONFRONTATIONAL VISUAL FIELD TEST (CVF)
OS_FINDINGS: FULL
OD_FINDINGS: FULL

## 2023-07-11 ASSESSMENT — AXIALLENGTH_DERIVED
OD_AL: 25.3037
OS_AL: 23.8436

## 2023-07-11 ASSESSMENT — VISUAL ACUITY
OS_BCVA: 20/100-
OD_BCVA: 20/25-2

## 2023-08-08 ENCOUNTER — OFFICE (OUTPATIENT)
Dept: URBAN - METROPOLITAN AREA CLINIC 103 | Facility: CLINIC | Age: 73
Setting detail: OPHTHALMOLOGY
End: 2023-08-08
Payer: MEDICARE

## 2023-08-08 DIAGNOSIS — H35.3211: ICD-10-CM

## 2023-08-08 PROCEDURE — 92134 CPTRZ OPH DX IMG PST SGM RTA: CPT | Performed by: SPECIALIST

## 2023-08-08 PROCEDURE — 92235 FLUORESCEIN ANGRPH MLTIFRAME: CPT | Performed by: SPECIALIST

## 2023-08-08 PROCEDURE — 67028 INJECTION EYE DRUG: CPT | Performed by: SPECIALIST

## 2023-08-08 ASSESSMENT — SPHEQUIV_DERIVED
OS_SPHEQUIV: -0.875
OD_SPHEQUIV: -3.625

## 2023-08-08 ASSESSMENT — REFRACTION_AUTOREFRACTION
OD_AXIS: 139
OS_SPHERE: -0.50
OD_CYLINDER: -1.25
OS_AXIS: 067
OS_CYLINDER: -0.75
OD_SPHERE: -3.00

## 2023-08-08 ASSESSMENT — KERATOMETRY
OD_AXISANGLE_DEGREES: 067
OS_AXISANGLE_DEGREES: 156
OD_K1POWER_DIOPTERS: 42.50
OS_K2POWER_DIOPTERS: 44.00
OD_K2POWER_DIOPTERS: 43.50
OS_K1POWER_DIOPTERS: 43.50

## 2023-08-08 ASSESSMENT — TONOMETRY: OD_IOP_MMHG: 19

## 2023-08-08 ASSESSMENT — CONFRONTATIONAL VISUAL FIELD TEST (CVF)
OD_FINDINGS: FULL
OS_FINDINGS: FULL

## 2023-08-08 ASSESSMENT — PACHYMETRY
OD_CT_UM: 619
OS_CT_CORRECTION: -4
OS_CT_UM: 599
OD_CT_CORRECTION: -5

## 2023-08-08 ASSESSMENT — SUPERFICIAL PUNCTATE KERATITIS (SPK)
OS_SPK: T
OD_SPK: T

## 2023-08-08 ASSESSMENT — VISUAL ACUITY
OD_BCVA: 20/20-1
OS_BCVA: 20/80-1

## 2023-08-08 ASSESSMENT — CORNEAL DYSTROPHY
OS_DYSTROPHY: MDF
OD_DYSTROPHY: MDF

## 2023-08-08 ASSESSMENT — AXIALLENGTH_DERIVED
OS_AL: 23.8436
OD_AL: 25.3037

## 2023-09-12 ENCOUNTER — OFFICE (OUTPATIENT)
Dept: URBAN - METROPOLITAN AREA CLINIC 103 | Facility: CLINIC | Age: 73
Setting detail: OPHTHALMOLOGY
End: 2023-09-12
Payer: MEDICARE

## 2023-09-12 DIAGNOSIS — H35.3231: ICD-10-CM

## 2023-09-12 DIAGNOSIS — H43.813: ICD-10-CM

## 2023-09-12 PROCEDURE — 92134 CPTRZ OPH DX IMG PST SGM RTA: CPT | Performed by: SPECIALIST

## 2023-09-12 PROCEDURE — 92014 COMPRE OPH EXAM EST PT 1/>: CPT | Performed by: SPECIALIST

## 2023-09-12 ASSESSMENT — REFRACTION_AUTOREFRACTION
OD_AXIS: 139
OD_SPHERE: -3.00
OS_AXIS: 067
OD_CYLINDER: -1.25
OS_SPHERE: -0.50
OS_CYLINDER: -0.75

## 2023-09-12 ASSESSMENT — CONFRONTATIONAL VISUAL FIELD TEST (CVF)
OD_FINDINGS: FULL
OS_FINDINGS: FULL

## 2023-09-12 ASSESSMENT — KERATOMETRY
OS_K2POWER_DIOPTERS: 44.00
OD_K2POWER_DIOPTERS: 43.50
OD_AXISANGLE_DEGREES: 067
OS_AXISANGLE_DEGREES: 156
OD_K1POWER_DIOPTERS: 42.50
OS_K1POWER_DIOPTERS: 43.50

## 2023-09-12 ASSESSMENT — AXIALLENGTH_DERIVED
OS_AL: 23.8436
OD_AL: 25.3037

## 2023-09-12 ASSESSMENT — CORNEAL DYSTROPHY
OD_DYSTROPHY: MDF
OS_DYSTROPHY: MDF

## 2023-09-12 ASSESSMENT — SUPERFICIAL PUNCTATE KERATITIS (SPK)
OS_SPK: T
OD_SPK: T

## 2023-09-12 ASSESSMENT — SPHEQUIV_DERIVED
OS_SPHEQUIV: -0.875
OD_SPHEQUIV: -3.625

## 2023-09-12 ASSESSMENT — PACHYMETRY
OD_CT_CORRECTION: -5
OS_CT_CORRECTION: -4
OS_CT_UM: 599
OD_CT_UM: 619

## 2023-09-12 ASSESSMENT — TONOMETRY
OS_IOP_MMHG: 20
OD_IOP_MMHG: 20

## 2023-09-12 ASSESSMENT — VISUAL ACUITY
OD_BCVA: 20/20-2
OS_BCVA: 20/100

## 2023-10-25 ENCOUNTER — OFFICE (OUTPATIENT)
Dept: URBAN - METROPOLITAN AREA CLINIC 103 | Facility: CLINIC | Age: 73
Setting detail: OPHTHALMOLOGY
End: 2023-10-25
Payer: MEDICARE

## 2023-10-25 DIAGNOSIS — H35.3211: ICD-10-CM

## 2023-10-25 DIAGNOSIS — H35.3231: ICD-10-CM

## 2023-10-25 PROCEDURE — 67028 INJECTION EYE DRUG: CPT | Mod: RT | Performed by: SPECIALIST

## 2023-10-25 PROCEDURE — 92134 CPTRZ OPH DX IMG PST SGM RTA: CPT | Performed by: SPECIALIST

## 2023-10-25 ASSESSMENT — SPHEQUIV_DERIVED
OD_SPHEQUIV: -3.625
OS_SPHEQUIV: -0.875

## 2023-10-25 ASSESSMENT — CONFRONTATIONAL VISUAL FIELD TEST (CVF)
OD_FINDINGS: FULL
OS_FINDINGS: FULL

## 2023-10-25 ASSESSMENT — TONOMETRY
OS_IOP_MMHG: 20
OD_IOP_MMHG: 19

## 2023-10-25 ASSESSMENT — KERATOMETRY
OD_K2POWER_DIOPTERS: 43.50
OS_AXISANGLE_DEGREES: 156
OS_K2POWER_DIOPTERS: 44.00
OS_K1POWER_DIOPTERS: 43.50
OD_K1POWER_DIOPTERS: 42.50
OD_AXISANGLE_DEGREES: 067

## 2023-10-25 ASSESSMENT — CORNEAL DYSTROPHY
OS_DYSTROPHY: MDF
OD_DYSTROPHY: MDF

## 2023-10-25 ASSESSMENT — SUPERFICIAL PUNCTATE KERATITIS (SPK)
OD_SPK: T
OS_SPK: T

## 2023-10-25 ASSESSMENT — PACHYMETRY
OD_CT_UM: 619
OS_CT_CORRECTION: -4
OS_CT_UM: 599
OD_CT_CORRECTION: -5

## 2023-10-25 ASSESSMENT — AXIALLENGTH_DERIVED
OD_AL: 25.3037
OS_AL: 23.8436

## 2023-10-25 ASSESSMENT — REFRACTION_AUTOREFRACTION
OD_SPHERE: -3.00
OD_AXIS: 139
OS_SPHERE: -0.50
OD_CYLINDER: -1.25
OS_CYLINDER: -0.75
OS_AXIS: 067

## 2023-10-25 ASSESSMENT — VISUAL ACUITY
OD_BCVA: 20/25
OS_BCVA: 20/100-1

## 2023-11-14 ENCOUNTER — OFFICE (OUTPATIENT)
Dept: URBAN - METROPOLITAN AREA CLINIC 103 | Facility: CLINIC | Age: 73
Setting detail: OPHTHALMOLOGY
End: 2023-11-14
Payer: MEDICARE

## 2023-11-14 DIAGNOSIS — H43.813: ICD-10-CM

## 2023-11-14 DIAGNOSIS — H35.3231: ICD-10-CM

## 2023-11-14 PROCEDURE — 92012 INTRM OPH EXAM EST PATIENT: CPT | Performed by: SPECIALIST

## 2023-11-14 PROCEDURE — 92134 CPTRZ OPH DX IMG PST SGM RTA: CPT | Performed by: SPECIALIST

## 2023-11-14 ASSESSMENT — REFRACTION_AUTOREFRACTION
OD_SPHERE: -3.00
OD_AXIS: 139
OD_CYLINDER: -1.25
OS_AXIS: 067
OS_CYLINDER: -0.75
OS_SPHERE: -0.50

## 2023-11-14 ASSESSMENT — CONFRONTATIONAL VISUAL FIELD TEST (CVF)
OS_FINDINGS: FULL
OD_FINDINGS: FULL

## 2023-11-14 ASSESSMENT — SUPERFICIAL PUNCTATE KERATITIS (SPK)
OD_SPK: T
OS_SPK: T

## 2023-11-14 ASSESSMENT — SPHEQUIV_DERIVED
OS_SPHEQUIV: -0.875
OD_SPHEQUIV: -3.625

## 2023-11-14 ASSESSMENT — CORNEAL DYSTROPHY
OS_DYSTROPHY: MDF
OD_DYSTROPHY: MDF

## 2024-01-24 ENCOUNTER — OFFICE (OUTPATIENT)
Dept: URBAN - METROPOLITAN AREA CLINIC 103 | Facility: CLINIC | Age: 74
Setting detail: OPHTHALMOLOGY
End: 2024-01-24
Payer: MEDICARE

## 2024-01-24 DIAGNOSIS — H35.3211: ICD-10-CM

## 2024-01-24 PROCEDURE — 67028 INJECTION EYE DRUG: CPT | Mod: RT | Performed by: SPECIALIST

## 2024-01-24 PROCEDURE — 92134 CPTRZ OPH DX IMG PST SGM RTA: CPT | Performed by: SPECIALIST

## 2024-01-24 ASSESSMENT — CORNEAL DYSTROPHY
OS_DYSTROPHY: MDF
OD_DYSTROPHY: MDF

## 2024-01-24 ASSESSMENT — REFRACTION_AUTOREFRACTION
OD_CYLINDER: -1.25
OD_AXIS: 139
OS_AXIS: 067
OS_SPHERE: -0.50
OS_CYLINDER: -0.75
OD_SPHERE: -3.00

## 2024-01-24 ASSESSMENT — SPHEQUIV_DERIVED
OS_SPHEQUIV: -0.875
OD_SPHEQUIV: -3.625

## 2024-01-24 ASSESSMENT — SUPERFICIAL PUNCTATE KERATITIS (SPK)
OD_SPK: T
OS_SPK: T

## 2024-01-30 ENCOUNTER — OFFICE (OUTPATIENT)
Dept: URBAN - METROPOLITAN AREA CLINIC 103 | Facility: CLINIC | Age: 74
Setting detail: OPHTHALMOLOGY
End: 2024-01-30
Payer: MEDICARE

## 2024-01-30 DIAGNOSIS — H16.103: ICD-10-CM

## 2024-01-30 DIAGNOSIS — H40.1131: ICD-10-CM

## 2024-01-30 DIAGNOSIS — H04.123: ICD-10-CM

## 2024-01-30 PROBLEM — H35.3221 AGE RELATED MACULAR DEGENERATION WET; RIGHT EYE ACTIVE NEOVASCULARIZATION, LEFT EYE ACTIVE NEOVASCULARIZATION: Status: ACTIVE | Noted: 2024-01-30

## 2024-01-30 PROBLEM — H35.3211 AGE RELATED MACULAR DEGENERATION WET; RIGHT EYE ACTIVE NEOVASCULARIZATION, LEFT EYE ACTIVE NEOVASCULARIZATION: Status: ACTIVE | Noted: 2024-01-30

## 2024-01-30 PROBLEM — H35.3231 AGE RELATED MACULAR DEGENERATION WET; RIGHT EYE ACTIVE NEOVASCULARIZATION, LEFT EYE ACTIVE NEOVASCULARIZATION: Status: ACTIVE | Noted: 2024-01-30

## 2024-01-30 PROCEDURE — 92083 EXTENDED VISUAL FIELD XM: CPT | Performed by: OPHTHALMOLOGY

## 2024-01-30 PROCEDURE — 92012 INTRM OPH EXAM EST PATIENT: CPT | Performed by: OPHTHALMOLOGY

## 2024-01-30 ASSESSMENT — REFRACTION_AUTOREFRACTION
OS_SPHERE: PLANO
OS_CYLINDER: -1.25
OD_AXIS: 132
OD_SPHERE: -2.00
OS_AXIS: 082
OD_CYLINDER: -1.50

## 2024-01-30 ASSESSMENT — CONFRONTATIONAL VISUAL FIELD TEST (CVF)
OD_FINDINGS: FULL
OS_FINDINGS: FULL

## 2024-01-30 ASSESSMENT — SUPERFICIAL PUNCTATE KERATITIS (SPK)
OD_SPK: T
OS_SPK: T

## 2024-01-30 ASSESSMENT — REFRACTION_CURRENTRX
OD_CYLINDER: -1.75
OS_ADD: +2.50
OS_OVR_VA: 20/
OD_VPRISM_DIRECTION: PROGS
OD_ADD: +2.50
OS_CYLINDER: -1.00
OS_VPRISM_DIRECTION: PROGS
OS_AXIS: 085
OS_SPHERE: +0.25
OD_OVR_VA: 20/
OD_SPHERE: -1.25
OD_AXIS: 145

## 2024-01-30 ASSESSMENT — CORNEAL DYSTROPHY
OD_DYSTROPHY: MDF
OS_DYSTROPHY: MDF

## 2024-01-30 ASSESSMENT — SPHEQUIV_DERIVED: OD_SPHEQUIV: -2.75

## 2024-02-28 ENCOUNTER — OFFICE (OUTPATIENT)
Dept: URBAN - METROPOLITAN AREA CLINIC 103 | Facility: CLINIC | Age: 74
Setting detail: OPHTHALMOLOGY
End: 2024-02-28
Payer: MEDICARE

## 2024-02-28 DIAGNOSIS — H43.813: ICD-10-CM

## 2024-02-28 DIAGNOSIS — H35.3231: ICD-10-CM

## 2024-02-28 PROBLEM — H35.3221 AGE RELATED MACULAR DEGENERATION WET; RIGHT EYE ACTIVE NEOVASCULARIZATION, LEFT EYE ACTIVE NEOVASCULARIZATION: Status: ACTIVE | Noted: 2024-02-28

## 2024-02-28 PROBLEM — H35.3211 AGE RELATED MACULAR DEGENERATION WET; RIGHT EYE ACTIVE NEOVASCULARIZATION, LEFT EYE ACTIVE NEOVASCULARIZATION: Status: ACTIVE | Noted: 2024-02-28

## 2024-02-28 PROCEDURE — 92134 CPTRZ OPH DX IMG PST SGM RTA: CPT | Performed by: SPECIALIST

## 2024-02-28 PROCEDURE — 92012 INTRM OPH EXAM EST PATIENT: CPT | Performed by: SPECIALIST

## 2024-02-28 ASSESSMENT — REFRACTION_CURRENTRX
OS_AXIS: 085
OD_CYLINDER: -1.75
OS_SPHERE: +0.25
OD_AXIS: 145
OD_SPHERE: -1.25
OD_ADD: +2.50
OD_OVR_VA: 20/
OS_OVR_VA: 20/
OD_VPRISM_DIRECTION: PROGS
OS_VPRISM_DIRECTION: PROGS
OS_ADD: +2.50
OS_CYLINDER: -1.00

## 2024-02-28 ASSESSMENT — REFRACTION_AUTOREFRACTION
OD_SPHERE: -2.00
OS_SPHERE: PLANO
OS_CYLINDER: -1.25
OD_AXIS: 132
OS_AXIS: 082
OD_CYLINDER: -1.50

## 2024-02-28 ASSESSMENT — CORNEAL DYSTROPHY
OS_DYSTROPHY: MDF
OD_DYSTROPHY: MDF

## 2024-02-28 ASSESSMENT — SUPERFICIAL PUNCTATE KERATITIS (SPK)
OD_SPK: T
OS_SPK: T

## 2024-02-28 ASSESSMENT — CONFRONTATIONAL VISUAL FIELD TEST (CVF)
OS_FINDINGS: FULL
OD_FINDINGS: FULL

## 2024-02-28 ASSESSMENT — SPHEQUIV_DERIVED: OD_SPHEQUIV: -2.75

## 2024-04-09 ENCOUNTER — OFFICE (OUTPATIENT)
Dept: URBAN - METROPOLITAN AREA CLINIC 103 | Facility: CLINIC | Age: 74
Setting detail: OPHTHALMOLOGY
End: 2024-04-09
Payer: MEDICARE

## 2024-04-09 DIAGNOSIS — H35.3231: ICD-10-CM

## 2024-04-09 DIAGNOSIS — H35.3211: ICD-10-CM

## 2024-04-09 PROCEDURE — 67028 INJECTION EYE DRUG: CPT | Mod: RT | Performed by: SPECIALIST

## 2024-04-09 PROCEDURE — 92134 CPTRZ OPH DX IMG PST SGM RTA: CPT | Performed by: SPECIALIST

## 2024-04-24 ENCOUNTER — OFFICE (OUTPATIENT)
Dept: URBAN - METROPOLITAN AREA CLINIC 103 | Facility: CLINIC | Age: 74
Setting detail: OPHTHALMOLOGY
End: 2024-04-24
Payer: MEDICARE

## 2024-04-24 DIAGNOSIS — H43.813: ICD-10-CM

## 2024-04-24 DIAGNOSIS — H35.3231: ICD-10-CM

## 2024-04-24 PROCEDURE — 92134 CPTRZ OPH DX IMG PST SGM RTA: CPT | Performed by: SPECIALIST

## 2024-04-24 PROCEDURE — 92012 INTRM OPH EXAM EST PATIENT: CPT | Performed by: SPECIALIST

## 2024-04-29 ENCOUNTER — APPOINTMENT (OUTPATIENT)
Dept: OTOLARYNGOLOGY | Facility: CLINIC | Age: 74
End: 2024-04-29
Payer: MEDICARE

## 2024-04-29 DIAGNOSIS — D44.7 NEOPLASM OF UNCERTAIN BEHAVIOR OF AORTIC BODY AND OTHER PARAGANGLIA: ICD-10-CM

## 2024-04-29 DIAGNOSIS — H90.3 SENSORINEURAL HEARING LOSS, BILATERAL: ICD-10-CM

## 2024-04-29 PROCEDURE — 99214 OFFICE O/P EST MOD 30 MIN: CPT

## 2024-04-29 PROCEDURE — 92557 COMPREHENSIVE HEARING TEST: CPT

## 2024-04-29 PROCEDURE — 92567 TYMPANOMETRY: CPT

## 2024-04-29 RX ORDER — METOPROLOL SUCCINATE 100 MG/1
100 TABLET, EXTENDED RELEASE ORAL
Qty: 180 | Refills: 0 | Status: ACTIVE | COMMUNITY
Start: 2023-08-03

## 2024-04-29 RX ORDER — LEVOTHYROXINE SODIUM 112 UG/1
112 TABLET ORAL
Qty: 180 | Refills: 0 | Status: ACTIVE | COMMUNITY
Start: 2023-09-26

## 2024-04-29 RX ORDER — CALCIUM CARBONATE 500 MG/1
TABLET, CHEWABLE ORAL
Refills: 0 | Status: DISCONTINUED | COMMUNITY
End: 2024-04-29

## 2024-04-29 RX ORDER — AMOXICILLIN 500 MG/1
500 CAPSULE ORAL
Qty: 21 | Refills: 0 | Status: DISCONTINUED | COMMUNITY
Start: 2023-11-06

## 2024-04-29 RX ORDER — AZELASTINE HYDROCHLORIDE 137 UG/1
137 SPRAY, METERED NASAL
Qty: 90 | Refills: 0 | Status: ACTIVE | COMMUNITY
Start: 2023-08-10

## 2024-04-29 RX ORDER — FLUTICASONE PROPIONATE 250 UG/1
POWDER, METERED RESPIRATORY (INHALATION)
Refills: 0 | Status: ACTIVE | COMMUNITY

## 2024-04-29 RX ORDER — ERGOCALCIFEROL 1.25 MG/1
1.25 MG CAPSULE, LIQUID FILLED ORAL
Qty: 12 | Refills: 0 | Status: ACTIVE | COMMUNITY
Start: 2024-01-22

## 2024-04-29 RX ORDER — AMIODARONE HYDROCHLORIDE 200 MG/1
200 TABLET ORAL
Qty: 180 | Refills: 0 | Status: ACTIVE | COMMUNITY
Start: 2023-04-25

## 2024-04-29 RX ORDER — FLUTICASONE PROPIONATE 100 UG/1
100 POWDER, METERED RESPIRATORY (INHALATION)
Qty: 180 | Refills: 0 | Status: ACTIVE | COMMUNITY
Start: 2023-12-29

## 2024-05-08 NOTE — HISTORY OF PRESENT ILLNESS
[de-identified] : 73 yo F with hx of right glomus presents for follow up. Has interittent tinnitus,

## 2024-05-08 NOTE — DATA REVIEWED
[de-identified] : Mild to moderately-severe SNHL 2504kHz rising to a moderate HL through 8kHz AU Type A tymps AU

## 2024-05-08 NOTE — PHYSICAL EXAM
[de-identified] : no change in right inferior glomus [Midline] : trachea located in midline position [Normal] : orientation to person, place, and time: normal

## 2024-05-22 ENCOUNTER — OFFICE (OUTPATIENT)
Dept: URBAN - METROPOLITAN AREA CLINIC 103 | Facility: CLINIC | Age: 74
Setting detail: OPHTHALMOLOGY
End: 2024-05-22
Payer: MEDICARE

## 2024-05-22 DIAGNOSIS — H43.813: ICD-10-CM

## 2024-05-22 DIAGNOSIS — H35.3231: ICD-10-CM

## 2024-05-22 PROCEDURE — 92012 INTRM OPH EXAM EST PATIENT: CPT | Performed by: SPECIALIST

## 2024-05-22 PROCEDURE — 92134 CPTRZ OPH DX IMG PST SGM RTA: CPT | Performed by: SPECIALIST

## 2024-05-22 ASSESSMENT — CONFRONTATIONAL VISUAL FIELD TEST (CVF)
OS_FINDINGS: FULL
OD_FINDINGS: FULL

## 2024-06-26 ENCOUNTER — OFFICE (OUTPATIENT)
Dept: URBAN - METROPOLITAN AREA CLINIC 103 | Facility: CLINIC | Age: 74
Setting detail: OPHTHALMOLOGY
End: 2024-06-26
Payer: MEDICARE

## 2024-06-26 DIAGNOSIS — H35.3231: ICD-10-CM

## 2024-06-26 DIAGNOSIS — H35.3211: ICD-10-CM

## 2024-06-26 PROCEDURE — 92134 CPTRZ OPH DX IMG PST SGM RTA: CPT | Performed by: SPECIALIST

## 2024-06-26 PROCEDURE — 67028 INJECTION EYE DRUG: CPT | Mod: RT | Performed by: SPECIALIST

## 2024-06-26 ASSESSMENT — CONFRONTATIONAL VISUAL FIELD TEST (CVF)
OS_FINDINGS: FULL
OD_FINDINGS: FULL

## 2024-07-24 ENCOUNTER — OFFICE (OUTPATIENT)
Dept: URBAN - METROPOLITAN AREA CLINIC 103 | Facility: CLINIC | Age: 74
Setting detail: OPHTHALMOLOGY
End: 2024-07-24
Payer: MEDICARE

## 2024-07-24 DIAGNOSIS — H35.3231: ICD-10-CM

## 2024-07-24 DIAGNOSIS — H35.3221: ICD-10-CM

## 2024-07-24 PROCEDURE — 92134 CPTRZ OPH DX IMG PST SGM RTA: CPT | Performed by: SPECIALIST

## 2024-07-24 PROCEDURE — 67028 INJECTION EYE DRUG: CPT | Mod: LT | Performed by: SPECIALIST

## 2024-07-24 ASSESSMENT — CONFRONTATIONAL VISUAL FIELD TEST (CVF)
OS_FINDINGS: FULL
OD_FINDINGS: FULL

## 2024-08-13 ENCOUNTER — OFFICE (OUTPATIENT)
Dept: URBAN - METROPOLITAN AREA CLINIC 103 | Facility: CLINIC | Age: 74
Setting detail: OPHTHALMOLOGY
End: 2024-08-13
Payer: MEDICARE

## 2024-08-13 DIAGNOSIS — H35.3231: ICD-10-CM

## 2024-08-13 DIAGNOSIS — H35.3211: ICD-10-CM

## 2024-08-13 PROCEDURE — 67028 INJECTION EYE DRUG: CPT | Mod: RT | Performed by: SPECIALIST

## 2024-08-13 PROCEDURE — 92134 CPTRZ OPH DX IMG PST SGM RTA: CPT | Performed by: SPECIALIST

## 2024-08-13 ASSESSMENT — CONFRONTATIONAL VISUAL FIELD TEST (CVF)
OS_FINDINGS: FULL
OD_FINDINGS: FULL

## 2024-08-22 ENCOUNTER — OFFICE (OUTPATIENT)
Dept: URBAN - METROPOLITAN AREA CLINIC 103 | Facility: CLINIC | Age: 74
Setting detail: OPHTHALMOLOGY
End: 2024-08-22
Payer: MEDICARE

## 2024-08-22 DIAGNOSIS — H04.123: ICD-10-CM

## 2024-08-22 DIAGNOSIS — H16.103: ICD-10-CM

## 2024-08-22 DIAGNOSIS — H40.1131: ICD-10-CM

## 2024-08-22 DIAGNOSIS — D31.02: ICD-10-CM

## 2024-08-22 PROCEDURE — 92133 CPTRZD OPH DX IMG PST SGM ON: CPT | Performed by: OPHTHALMOLOGY

## 2024-08-22 PROCEDURE — 92012 INTRM OPH EXAM EST PATIENT: CPT | Performed by: OPHTHALMOLOGY

## 2024-08-22 ASSESSMENT — CONFRONTATIONAL VISUAL FIELD TEST (CVF)
OD_FINDINGS: FULL
OS_FINDINGS: FULL

## 2024-09-10 ENCOUNTER — OFFICE (OUTPATIENT)
Dept: URBAN - METROPOLITAN AREA CLINIC 103 | Facility: CLINIC | Age: 74
Setting detail: OPHTHALMOLOGY
End: 2024-09-10
Payer: MEDICARE

## 2024-09-10 DIAGNOSIS — D31.02: ICD-10-CM

## 2024-09-10 DIAGNOSIS — H43.813: ICD-10-CM

## 2024-09-10 DIAGNOSIS — H35.3231: ICD-10-CM

## 2024-09-10 PROCEDURE — 92134 CPTRZ OPH DX IMG PST SGM RTA: CPT | Performed by: SPECIALIST

## 2024-09-10 PROCEDURE — 92012 INTRM OPH EXAM EST PATIENT: CPT | Performed by: SPECIALIST

## 2024-09-10 ASSESSMENT — CONFRONTATIONAL VISUAL FIELD TEST (CVF)
OS_FINDINGS: FULL
OD_FINDINGS: FULL

## 2024-09-11 ASSESSMENT — KERATOMETRY
OD_K1POWER_DIOPTERS: 43.25
OS_AXISANGLE_DEGREES: 168
OS_K1POWER_DIOPTERS: 43.00
OD_AXISANGLE_DEGREES: 060
OS_K2POWER_DIOPTERS: 44.00
OD_K2POWER_DIOPTERS: 44.25

## 2024-09-11 ASSESSMENT — REFRACTION_CURRENTRX
OD_ADD: +2.50
OD_AXIS: 149
OS_VPRISM_DIRECTION: PROGS
OS_SPHERE: +0.25
OS_OVR_VA: 20/
OS_AXIS: 071
OD_OVR_VA: 20/
OD_VPRISM_DIRECTION: PROGS
OD_SPHERE: -1.25
OD_CYLINDER: -1.75
OS_ADD: +2.50
OS_CYLINDER: -0.75

## 2024-10-23 ENCOUNTER — OFFICE (OUTPATIENT)
Dept: URBAN - METROPOLITAN AREA CLINIC 103 | Facility: CLINIC | Age: 74
Setting detail: OPHTHALMOLOGY
End: 2024-10-23
Payer: MEDICARE

## 2024-10-23 DIAGNOSIS — H35.3221: ICD-10-CM

## 2024-10-23 DIAGNOSIS — H35.3231: ICD-10-CM

## 2024-10-23 PROCEDURE — 92134 CPTRZ OPH DX IMG PST SGM RTA: CPT | Performed by: SPECIALIST

## 2024-10-23 PROCEDURE — 67028 INJECTION EYE DRUG: CPT | Mod: LT | Performed by: SPECIALIST

## 2024-10-23 ASSESSMENT — PACHYMETRY
OS_CT_CORRECTION: -4
OS_CT_UM: 599
OD_CT_UM: 619
OD_CT_CORRECTION: -5

## 2024-10-23 ASSESSMENT — REFRACTION_CURRENTRX
OD_AXIS: 149
OS_CYLINDER: -0.75
OS_OVR_VA: 20/
OD_ADD: +2.50
OD_VPRISM_DIRECTION: PROGS
OD_OVR_VA: 20/
OS_SPHERE: +0.25
OS_AXIS: 071
OS_ADD: +2.50
OS_VPRISM_DIRECTION: PROGS
OD_SPHERE: -1.25
OD_CYLINDER: -1.75

## 2024-10-23 ASSESSMENT — REFRACTION_AUTOREFRACTION
OS_SPHERE: PLANO
OD_SPHERE: -2.50
OS_CYLINDER: -1.75
OD_AXIS: 136
OD_CYLINDER: -1.25
OS_AXIS: 080

## 2024-10-23 ASSESSMENT — SUPERFICIAL PUNCTATE KERATITIS (SPK)
OS_SPK: T
OD_SPK: T

## 2024-10-23 ASSESSMENT — KERATOMETRY
OD_K1POWER_DIOPTERS: 43.25
OS_K2POWER_DIOPTERS: 44.00
OS_K1POWER_DIOPTERS: 43.00
OD_AXISANGLE_DEGREES: 060
OS_AXISANGLE_DEGREES: 168
OD_K2POWER_DIOPTERS: 44.25

## 2024-10-23 ASSESSMENT — CORNEAL DYSTROPHY
OD_DYSTROPHY: MDF
OS_DYSTROPHY: MDF

## 2024-10-23 ASSESSMENT — CONFRONTATIONAL VISUAL FIELD TEST (CVF)
OS_FINDINGS: FULL
OD_FINDINGS: FULL

## 2024-10-23 ASSESSMENT — TONOMETRY: OD_IOP_MMHG: 19

## 2024-10-23 ASSESSMENT — VISUAL ACUITY
OD_BCVA: 20/25-2
OS_BCVA: 20/150

## 2024-12-19 ENCOUNTER — OFFICE (OUTPATIENT)
Dept: URBAN - METROPOLITAN AREA CLINIC 103 | Facility: CLINIC | Age: 74
Setting detail: OPHTHALMOLOGY
End: 2024-12-19
Payer: MEDICARE

## 2024-12-19 DIAGNOSIS — H35.3231: ICD-10-CM

## 2024-12-19 DIAGNOSIS — H43.813: ICD-10-CM

## 2024-12-19 PROCEDURE — 92134 CPTRZ OPH DX IMG PST SGM RTA: CPT | Performed by: SPECIALIST

## 2024-12-19 PROCEDURE — 92014 COMPRE OPH EXAM EST PT 1/>: CPT | Performed by: SPECIALIST

## 2024-12-19 ASSESSMENT — CORNEAL DYSTROPHY
OS_DYSTROPHY: MDF
OD_DYSTROPHY: MDF

## 2024-12-19 ASSESSMENT — KERATOMETRY
OS_K2POWER_DIOPTERS: 44.00
OS_K1POWER_DIOPTERS: 43.00
OD_K1POWER_DIOPTERS: 43.25
OS_AXISANGLE_DEGREES: 168
OD_AXISANGLE_DEGREES: 060
OD_K2POWER_DIOPTERS: 44.25

## 2024-12-19 ASSESSMENT — REFRACTION_AUTOREFRACTION
OD_AXIS: 136
OS_SPHERE: PLANO
OS_AXIS: 080
OD_SPHERE: -2.50
OS_CYLINDER: -1.75
OD_CYLINDER: -1.25

## 2024-12-19 ASSESSMENT — PACHYMETRY
OD_CT_UM: 619
OS_CT_UM: 599
OD_CT_CORRECTION: -5
OS_CT_CORRECTION: -4

## 2024-12-19 ASSESSMENT — SUPERFICIAL PUNCTATE KERATITIS (SPK)
OS_SPK: T
OD_SPK: T

## 2024-12-19 ASSESSMENT — CONFRONTATIONAL VISUAL FIELD TEST (CVF)
OD_FINDINGS: FULL
OS_FINDINGS: FULL

## 2024-12-19 ASSESSMENT — VISUAL ACUITY
OD_BCVA: 20/25-2
OS_BCVA: 20/150

## 2024-12-19 ASSESSMENT — TONOMETRY
OS_IOP_MMHG: 20
OD_IOP_MMHG: 18

## 2025-01-22 ENCOUNTER — OFFICE (OUTPATIENT)
Dept: URBAN - METROPOLITAN AREA CLINIC 103 | Facility: CLINIC | Age: 75
Setting detail: OPHTHALMOLOGY
End: 2025-01-22
Payer: MEDICARE

## 2025-01-22 DIAGNOSIS — H35.3231: ICD-10-CM

## 2025-01-22 DIAGNOSIS — H43.813: ICD-10-CM

## 2025-01-22 PROCEDURE — 92134 CPTRZ OPH DX IMG PST SGM RTA: CPT | Performed by: SPECIALIST

## 2025-01-22 PROCEDURE — 92014 COMPRE OPH EXAM EST PT 1/>: CPT | Performed by: SPECIALIST

## 2025-01-22 ASSESSMENT — PACHYMETRY
OD_CT_CORRECTION: -5
OS_CT_UM: 599
OS_CT_CORRECTION: -4
OD_CT_UM: 619

## 2025-01-22 ASSESSMENT — VISUAL ACUITY
OD_BCVA: 20/25
OS_BCVA: 20/150

## 2025-01-22 ASSESSMENT — CORNEAL DYSTROPHY
OS_DYSTROPHY: MDF
OD_DYSTROPHY: MDF

## 2025-01-22 ASSESSMENT — KERATOMETRY
OD_K1POWER_DIOPTERS: 43.25
OS_AXISANGLE_DEGREES: 168
OD_AXISANGLE_DEGREES: 060
OD_K2POWER_DIOPTERS: 44.25
OS_K1POWER_DIOPTERS: 43.00
OS_K2POWER_DIOPTERS: 44.00

## 2025-01-22 ASSESSMENT — REFRACTION_AUTOREFRACTION
OD_AXIS: 136
OS_AXIS: 080
OS_SPHERE: PLANO
OD_SPHERE: -2.50
OD_CYLINDER: -1.25
OS_CYLINDER: -1.75

## 2025-01-22 ASSESSMENT — SUPERFICIAL PUNCTATE KERATITIS (SPK)
OD_SPK: T
OS_SPK: T

## 2025-01-22 ASSESSMENT — CONFRONTATIONAL VISUAL FIELD TEST (CVF)
OD_FINDINGS: FULL
OS_FINDINGS: FULL

## 2025-02-17 ENCOUNTER — OFFICE (OUTPATIENT)
Dept: URBAN - METROPOLITAN AREA CLINIC 94 | Facility: CLINIC | Age: 75
Setting detail: OPHTHALMOLOGY
End: 2025-02-17
Payer: MEDICARE

## 2025-02-17 DIAGNOSIS — H35.3211: ICD-10-CM

## 2025-02-17 DIAGNOSIS — H35.3231: ICD-10-CM

## 2025-02-17 PROCEDURE — 92134 CPTRZ OPH DX IMG PST SGM RTA: CPT | Performed by: SPECIALIST

## 2025-02-17 PROCEDURE — 67028 INJECTION EYE DRUG: CPT | Mod: RT | Performed by: SPECIALIST

## 2025-02-17 ASSESSMENT — KERATOMETRY
OD_K2POWER_DIOPTERS: 44.25
OD_K1POWER_DIOPTERS: 43.25
OD_AXISANGLE_DEGREES: 060
OS_K1POWER_DIOPTERS: 43.00
OS_K2POWER_DIOPTERS: 44.00
OS_AXISANGLE_DEGREES: 168

## 2025-02-17 ASSESSMENT — PACHYMETRY
OD_CT_CORRECTION: -5
OS_CT_CORRECTION: -4
OS_CT_UM: 599
OD_CT_UM: 619

## 2025-02-17 ASSESSMENT — CORNEAL DYSTROPHY
OD_DYSTROPHY: MDF
OS_DYSTROPHY: MDF

## 2025-02-17 ASSESSMENT — REFRACTION_AUTOREFRACTION
OS_SPHERE: PLANO
OD_SPHERE: -2.50
OD_AXIS: 136
OD_CYLINDER: -1.25
OS_CYLINDER: -1.75
OS_AXIS: 080

## 2025-02-17 ASSESSMENT — VISUAL ACUITY
OD_BCVA: 20/20-
OS_BCVA: 20/300

## 2025-02-17 ASSESSMENT — SUPERFICIAL PUNCTATE KERATITIS (SPK)
OS_SPK: T
OD_SPK: T

## 2025-03-11 ENCOUNTER — OFFICE (OUTPATIENT)
Dept: URBAN - METROPOLITAN AREA CLINIC 103 | Facility: CLINIC | Age: 75
Setting detail: OPHTHALMOLOGY
End: 2025-03-11
Payer: MEDICARE

## 2025-03-11 DIAGNOSIS — H35.3221: ICD-10-CM

## 2025-03-11 DIAGNOSIS — H35.3231: ICD-10-CM

## 2025-03-11 PROCEDURE — 92134 CPTRZ OPH DX IMG PST SGM RTA: CPT | Performed by: SPECIALIST

## 2025-03-11 PROCEDURE — 67028 INJECTION EYE DRUG: CPT | Mod: LT | Performed by: SPECIALIST

## 2025-03-11 ASSESSMENT — SUPERFICIAL PUNCTATE KERATITIS (SPK)
OD_SPK: T
OS_SPK: T

## 2025-03-11 ASSESSMENT — PACHYMETRY
OS_CT_UM: 599
OS_CT_CORRECTION: -4
OD_CT_UM: 619
OD_CT_CORRECTION: -5

## 2025-03-11 ASSESSMENT — REFRACTION_AUTOREFRACTION
OS_CYLINDER: -1.75
OS_AXIS: 080
OS_SPHERE: PLANO
OD_AXIS: 136
OD_SPHERE: -2.50
OD_CYLINDER: -1.25

## 2025-03-11 ASSESSMENT — KERATOMETRY
OD_K1POWER_DIOPTERS: 43.25
OS_K1POWER_DIOPTERS: 43.00
OD_AXISANGLE_DEGREES: 060
OS_AXISANGLE_DEGREES: 168
OD_K2POWER_DIOPTERS: 44.25
OS_K2POWER_DIOPTERS: 44.00

## 2025-03-11 ASSESSMENT — CORNEAL DYSTROPHY
OD_DYSTROPHY: MDF
OS_DYSTROPHY: MDF

## 2025-03-11 ASSESSMENT — CONFRONTATIONAL VISUAL FIELD TEST (CVF)
OD_FINDINGS: FULL
OS_FINDINGS: FULL

## 2025-03-11 ASSESSMENT — VISUAL ACUITY
OD_BCVA: 20/25
OS_BCVA: 20/250

## 2025-03-11 ASSESSMENT — TONOMETRY: OS_IOP_MMHG: 15

## 2025-04-01 ENCOUNTER — NON-APPOINTMENT (OUTPATIENT)
Age: 75
End: 2025-04-01

## 2025-04-01 ENCOUNTER — APPOINTMENT (OUTPATIENT)
Facility: CLINIC | Age: 75
End: 2025-04-01
Payer: MEDICARE

## 2025-04-01 VITALS — OXYGEN SATURATION: 95 % | HEART RATE: 56 BPM | DIASTOLIC BLOOD PRESSURE: 64 MMHG | SYSTOLIC BLOOD PRESSURE: 104 MMHG

## 2025-04-01 VITALS — HEIGHT: 63.5 IN | WEIGHT: 293 LBS | BODY MASS INDEX: 51.27 KG/M2

## 2025-04-01 DIAGNOSIS — F41.9 ANXIETY DISORDER, UNSPECIFIED: ICD-10-CM

## 2025-04-01 DIAGNOSIS — I48.0 PAROXYSMAL ATRIAL FIBRILLATION: ICD-10-CM

## 2025-04-01 DIAGNOSIS — E78.5 HYPERLIPIDEMIA, UNSPECIFIED: ICD-10-CM

## 2025-04-01 DIAGNOSIS — I10 ESSENTIAL (PRIMARY) HYPERTENSION: ICD-10-CM

## 2025-04-01 DIAGNOSIS — I48.21 PERMANENT ATRIAL FIBRILLATION: ICD-10-CM

## 2025-04-01 DIAGNOSIS — R06.02 SHORTNESS OF BREATH: ICD-10-CM

## 2025-04-01 DIAGNOSIS — I50.30 UNSPECIFIED DIASTOLIC (CONGESTIVE) HEART FAILURE: ICD-10-CM

## 2025-04-01 DIAGNOSIS — I50.9 HEART FAILURE, UNSPECIFIED: ICD-10-CM

## 2025-04-01 DIAGNOSIS — E66.01 MORBID (SEVERE) OBESITY DUE TO EXCESS CALORIES: ICD-10-CM

## 2025-04-01 DIAGNOSIS — R60.0 LOCALIZED EDEMA: ICD-10-CM

## 2025-04-01 PROCEDURE — 93000 ELECTROCARDIOGRAM COMPLETE: CPT

## 2025-04-01 PROCEDURE — 99204 OFFICE O/P NEW MOD 45 MIN: CPT | Mod: 25

## 2025-04-07 ENCOUNTER — NON-APPOINTMENT (OUTPATIENT)
Age: 75
End: 2025-04-07

## 2025-04-15 PROCEDURE — 99222 1ST HOSP IP/OBS MODERATE 55: CPT

## 2025-04-16 PROCEDURE — 99232 SBSQ HOSP IP/OBS MODERATE 35: CPT

## 2025-04-17 PROCEDURE — 99232 SBSQ HOSP IP/OBS MODERATE 35: CPT

## 2025-04-18 PROCEDURE — 99232 SBSQ HOSP IP/OBS MODERATE 35: CPT

## 2025-04-20 PROCEDURE — 99232 SBSQ HOSP IP/OBS MODERATE 35: CPT

## 2025-04-21 PROCEDURE — 99232 SBSQ HOSP IP/OBS MODERATE 35: CPT

## 2025-04-22 PROCEDURE — 99232 SBSQ HOSP IP/OBS MODERATE 35: CPT

## 2025-04-23 PROCEDURE — 99232 SBSQ HOSP IP/OBS MODERATE 35: CPT

## 2025-04-24 PROCEDURE — 99232 SBSQ HOSP IP/OBS MODERATE 35: CPT

## 2025-04-25 PROCEDURE — 99232 SBSQ HOSP IP/OBS MODERATE 35: CPT

## 2025-04-27 PROCEDURE — 99232 SBSQ HOSP IP/OBS MODERATE 35: CPT

## 2025-04-29 PROCEDURE — 99232 SBSQ HOSP IP/OBS MODERATE 35: CPT

## 2025-05-05 ENCOUNTER — APPOINTMENT (OUTPATIENT)
Dept: OTOLARYNGOLOGY | Facility: CLINIC | Age: 75
End: 2025-05-05

## 2025-07-01 ENCOUNTER — APPOINTMENT (OUTPATIENT)
Facility: CLINIC | Age: 75
End: 2025-07-01